# Patient Record
Sex: MALE | Race: WHITE | NOT HISPANIC OR LATINO | Employment: FULL TIME | URBAN - METROPOLITAN AREA
[De-identification: names, ages, dates, MRNs, and addresses within clinical notes are randomized per-mention and may not be internally consistent; named-entity substitution may affect disease eponyms.]

---

## 2024-11-22 ENCOUNTER — OFFICE VISIT (OUTPATIENT)
Dept: URGENT CARE | Age: 39
End: 2024-11-22
Payer: COMMERCIAL

## 2024-11-22 VITALS
RESPIRATION RATE: 16 BRPM | SYSTOLIC BLOOD PRESSURE: 130 MMHG | OXYGEN SATURATION: 97 % | HEART RATE: 69 BPM | DIASTOLIC BLOOD PRESSURE: 90 MMHG | TEMPERATURE: 98 F

## 2024-11-22 DIAGNOSIS — J02.9 VIRAL PHARYNGITIS: Primary | ICD-10-CM

## 2024-11-22 DIAGNOSIS — J02.9 SORE THROAT: ICD-10-CM

## 2024-11-22 LAB — POC RAPID STREP: NEGATIVE

## 2024-11-22 RX ORDER — LISINOPRIL 2.5 MG/1
2.5 TABLET ORAL DAILY
COMMUNITY

## 2024-11-22 RX ORDER — SERTRALINE HYDROCHLORIDE 20 MG/ML
25 SOLUTION ORAL DAILY
COMMUNITY

## 2024-11-22 NOTE — PATIENT INSTRUCTIONS
"  Sore Throat    You are being teated for acute pharyngitis. The most likely cause is due to a virus.    PLAN:  1.  Please take Tylenol every 6 hours as needed for pain and fever. Alternate with ibuprofen every 6 hours as needed for pain and fever.  2.   You can use Chloraseptic spray to help with sore throat.  You can try MUCINEX InstaSoothe Sore Throat + Soothing Comfort - Honey & Echinacea.  Be sure to follow age appropriate instructions.    Use and take all medication as directed    3.  Gargle with salt water 4-5 times a day to help with sore throat.  4.  Stay well-hydrated and drink lots of fluids.  5.  Suck on lozenges to help moisten throat.  6.  Use saline nasal spray twice a day to keep mucous membranes moist.  7.  Follow up with your primary care physician in 3-5 days if symptoms persist.  8.  Return if symptoms worsen or new symptoms develop.    -You can treat with Chloraseptic throat spray for patients 3 years and older (use as directed) -Be sure to follow all labeling and instructions when taking over-the-counter cold medications.  -Saltwater gargles every 2 hours to pull inflammation  -Take Motrin, Tylenol for pain (use as directed)    A few helpful tips to reduce pain from a sore throat:    Diet of smooth, slippery and wet foods  Ice cream  Jell-O  Hard sucking candies (e.g. butterscotch)  Honey (2 tsp) to coat throat  Avoid in age under 1 year (Botulism risk)  Soothing throat lozenges  Menthol lozenges (e.g. Vicks)  Pectin (e.g. Halls Breezers)  Herbal tea containing \"Demulcents\" (e.g. Throat coat)  Contains Elm bark, licorice root and marshmallow root  Offers short-term relief of Pharyngitis (<30 minutes)    You most likely have a viral pharyngitis.  Antibiotics are not needed at this time.  People with strep throat or other bacterial infections are given antibiotics. The cause of your sore throat is most likely viral. It is important to relieve the pain of sore throat so that people can eat and " drink. Ibuprofen or acetaminophen helps relieve pain and fever. Warm saltwater gargles and throat lozenges or throat sprays (such as those containing benzocaine, lidocaine, or dyclonine) may temporarily help relieve pain. Providing soup is a good way to keep children well hydrated and nourished when swallowing is painful and before their appetite has returned.

## 2024-11-22 NOTE — PROGRESS NOTES
"Subjective   Patient ID: Vic Paige \"Miky" is a 39 y.o. male. They present today with a chief complaint of Sore Throat (Sore throat x 3 days).    CC: Sore throat    HPI: Patient presenting for sore throat x 3 days.  No trismus or drooling.  No difficulty swallowing.  No fever, chills, myalgias, abdominal pain, nausea, vomiting, diarrhea, rash.  No coughing.  No other concerns or complaints.    Past Medical History  Allergies as of 11/22/2024 - Reviewed 11/22/2024   Allergen Reaction Noted    Codeine Unknown 11/22/2024    Penicillin g Hives 11/22/2024       (Not in a hospital admission)      History reviewed. No pertinent past medical history.    Past Surgical History:   Procedure Laterality Date    OTHER SURGICAL HISTORY  08/03/2022    No history of surgery            Review of Systems  Review of Systems    After reviewing all body systems I have documented pertinent findings above in the history.  All other Systems reviewed and are negative for complaint.  Pertinent positive and negatives are listed in the above HPI.    Objective    Vitals:    11/22/24 0902   BP: 130/90   Pulse: 69   Resp: 16   Temp: 36.7 °C (98 °F)   SpO2: 97%     No LMP for male patient.  Physical Exam    General: Alert, oriented, and cooperative.  No acute distress. Well developed, well nourished.     Skin: Skin is warm, and dry. No rashes or lesions.    Eyes: Sclera and conjunctivae normal     Ears: TM´s are intact, grey, with good cone of light.  No hemotympanum or rupture. Bilateral auricle, helix, and tragus without inflammation or erythema.  No mastoid erythema, or tenderness.  No deformities. Right and left canal negative for erythema, or discharge.  Hearing is grossly intact bilaterally    Mouth/Throat: Pharynx is erythematous.  Tonsils are 2+, equal in size.  No exudates.  No angioedema of the lips or tongue.  No respiratory compromise, tripoding, drooling, muffled voice,  stridor, or trismus.     Neck: Supple.  No lymphadenopathy " appreciated    Cardiac: Regular rate and rhythm    Respiratory:  No acute respiratory distress.  Regular rate of breathing.  No accessory muscle use.  No tripoding.          Procedures  Point of Care Test & Imaging Results from this visit  Results for orders placed or performed in visit on 11/22/24   POCT rapid strep A manually resulted    Collection Time: 11/22/24  9:04 AM   Result Value Ref Range    POC Rapid Strep Negative Negative     No results found.  Diagnostic study results (if any) were reviewed by Sarabjit Quinteros PA-C.  Assessment/Plan   Allergies, medications, history, and pertinent labs/EKGs/Imaging reviewed by Sarabjit Quinteros PA-C.     MDM:  Patient presenting for a sore throat X 3 days    Exam findings positive for posterior pharyngeal erythema. Neck is supple without meningeal signs.  Patient does not appear toxic. No respiratory compromise, tripoding, drooling, muffled voice, facial or neck tenderness, erythema, warmth, edema, or crepitus. No trachea tenderness or pain out of proportion. No clinical signs to suggest Meningitis, Jeff´s angina, peritonsillar abscess, epiglottis, or other life threatening oral pathology.     Counseled patient/family of the diagnosis and advised symptomatic relief with over the counter medications such as Tylenol, ibuprofen, and salt water gargle. Pt/family instructed to f/u with PCP and encouraged to return if symptoms worsen or if new symptoms develop.     Orders and Diagnoses  Diagnoses and all orders for this visit:  Sore throat  -     POCT rapid strep A manually resulted    Medical Admin Record      Patient disposition: Home    Electronically signed by Sarabjit Quinteros PA-C  9:12 AM